# Patient Record
Sex: MALE | Race: WHITE | NOT HISPANIC OR LATINO | URBAN - METROPOLITAN AREA
[De-identification: names, ages, dates, MRNs, and addresses within clinical notes are randomized per-mention and may not be internally consistent; named-entity substitution may affect disease eponyms.]

---

## 2024-02-01 ENCOUNTER — OFFICE VISIT (OUTPATIENT)
Dept: PRIMARY CARE | Facility: CLINIC | Age: 29
End: 2024-02-01
Payer: COMMERCIAL

## 2024-02-01 VITALS
BODY MASS INDEX: 26.12 KG/M2 | WEIGHT: 209 LBS | OXYGEN SATURATION: 97 % | SYSTOLIC BLOOD PRESSURE: 103 MMHG | DIASTOLIC BLOOD PRESSURE: 67 MMHG | HEART RATE: 94 BPM

## 2024-02-01 DIAGNOSIS — Z00.00 HEALTH CARE MAINTENANCE: ICD-10-CM

## 2024-02-01 DIAGNOSIS — F33.9 RECURRENT MAJOR DEPRESSIVE DISORDER, REMISSION STATUS UNSPECIFIED (CMS-HCC): Primary | ICD-10-CM

## 2024-02-01 PROBLEM — F32.A DEPRESSION: Status: ACTIVE | Noted: 2024-02-01

## 2024-02-01 PROCEDURE — 1036F TOBACCO NON-USER: CPT | Performed by: INTERNAL MEDICINE

## 2024-02-01 PROCEDURE — 99213 OFFICE O/P EST LOW 20 MIN: CPT | Performed by: INTERNAL MEDICINE

## 2024-02-01 RX ORDER — FLUOXETINE 20 MG/1
20 TABLET ORAL DAILY
Qty: 90 TABLET | Refills: 0 | Status: SHIPPED | OUTPATIENT
Start: 2024-02-01 | End: 2024-02-02 | Stop reason: SDUPTHER

## 2024-02-01 RX ORDER — FLUOXETINE 20 MG/1
20 TABLET ORAL DAILY
COMMUNITY
End: 2024-02-01 | Stop reason: SDUPTHER

## 2024-02-01 ASSESSMENT — PATIENT HEALTH QUESTIONNAIRE - PHQ9
SUM OF ALL RESPONSES TO PHQ9 QUESTIONS 1 AND 2: 6
2. FEELING DOWN, DEPRESSED OR HOPELESS: NEARLY EVERY DAY
1. LITTLE INTEREST OR PLEASURE IN DOING THINGS: NEARLY EVERY DAY

## 2024-02-01 NOTE — PROGRESS NOTES
Subjective   Patient ID: Can Gallo is a 28 y.o. male who presents for Follow-up.    HPI     Patient is a 28-year-old male with past medical history of depression presents for follow-up.  He states that he has previously been on fluoxetine however stopped it for unclear reasons.  He states that he has been doing well off the medication however recently he has been feeling depressed and wishes to go back on it.  He states that he is looking for a new job and his fiancée is internal medicine genetic resident who is looking for a fellowship or possibly a job in the upcoming months.  He is under stress at home.  No children.  He has a wedding upcoming.    Review of Systems  Constitutional: No fever or chills  Cardiovascular: no chest pain, no palpitations and no syncope.   Respiratory: no cough, no shortness of breath during exertion and no shortness of breath at rest.   Gastrointestinal: no abdominal pain, no nausea and no vomiting.  Neuro: No Headache, no dizziness    Objective   /67   Pulse 94   Wt 94.8 kg (209 lb)   SpO2 97%   BMI 26.12 kg/m²     Physical Exam  Constitutional: Alert and in no acute distress. Well developed, well nourished  Head and Face: Head and face: Normal.    Cardiovascular: Heart rate and rhythm were normal, normal S1 and S2. No peripheral edema.   Pulmonary: No respiratory distress. Clear bilateral breath sounds.  Musculoskeletal: Gait and station: Normal. Muscle strength/tone: Normal.   Skin: Normal skin color and pigmentation, normal skin turgor, and no rash.    Psychiatric: Judgment and insight: Intact. Mood and affect: Normal.        Lab Results   Component Value Date    WBC 6.1 01/27/2022    HGB 16.2 01/27/2022    HCT 47.7 01/27/2022     01/27/2022    CHOL 223 (H) 01/27/2022    TRIG 306 (H) 01/27/2022    HDL 30.0 (A) 01/27/2022    ALT 25 01/27/2022    AST 20 01/27/2022     01/27/2022    K 4.6 01/27/2022     01/27/2022    CREATININE 1.07 01/27/2022    BUN  21 01/27/2022    CO2 28 01/27/2022    TSH 1.78 01/27/2022       No image results found.            Assessment/Plan   Problem List Items Addressed This Visit             ICD-10-CM    Depression - Primary F32.A     Considering worsening anxiety and depression we will restart the fluoxetine.  Check screening labs.  Defer psychiatry and psychology at this time.  Will have patient return to clinic in 30 days for reevaluation         Relevant Medications    FLUoxetine (PROzac) 20 mg tablet     Other Visit Diagnoses         Codes    Health care maintenance     Z00.00    Relevant Orders    CBC    Comprehensive metabolic panel    Lipid Panel

## 2024-02-01 NOTE — ASSESSMENT & PLAN NOTE
Considering worsening anxiety and depression we will restart the fluoxetine.  Check screening labs.  Defer psychiatry and psychology at this time.  Will have patient return to clinic in 30 days for reevaluation

## 2024-02-02 DIAGNOSIS — F33.9 RECURRENT MAJOR DEPRESSIVE DISORDER, REMISSION STATUS UNSPECIFIED (CMS-HCC): ICD-10-CM

## 2024-02-04 RX ORDER — FLUOXETINE 20 MG/1
20 TABLET ORAL DAILY
Qty: 90 TABLET | Refills: 0 | Status: SHIPPED | OUTPATIENT
Start: 2024-02-04

## 2024-05-01 ENCOUNTER — APPOINTMENT (OUTPATIENT)
Dept: PRIMARY CARE | Facility: CLINIC | Age: 29
End: 2024-05-01
Payer: COMMERCIAL